# Patient Record
(demographics unavailable — no encounter records)

---

## 2024-11-27 NOTE — DEVELOPMENTAL MILESTONES
[Normal Development] : Normal Development [None] : none [Laughs aloud] : laughs aloud [Turns to voice] : turns to voice [Vocalizes with extending cooing] : vocalizes with extending cooing [Rolls over prone to supine] : rolls over prone to supine [Supports on elbows & wrists in prone] : supports on elbows and wrists in prone [Keeps hands unfisted] : keeps hands unfisted [Plays with fingers in midline] : plays with fingers in midline [Grasps objects] : grasps objects [Not Passed] : not passed [No] : Not Completed. [FreeTextEntry2] : 16

## 2024-11-27 NOTE — HISTORY OF PRESENT ILLNESS
[Mother] : mother [Normal] : Normal [In Bassinet/Crib] : sleeps in bassinet/crib [On back] : sleeps on back [Sleeps 12-16 hours per 24 hours (including naps)] : sleeps 12-16 hours per 24 hours (including naps) [Pacifier use] : Pacifier use [Tummy time] : tummy time [No] : No cigarette smoke exposure [Rear facing car seat in back seat] : Rear facing car seat in back seat [NO] : No [FreeTextEntry7] : LAST WELL AT 2 MONTHS OF AGE DID NOT SEEK COUNSELING [de-identified] : SLEEPING BETTER [de-identified] : ENFAMIL 6 OZ BOTTLES ( USUALLY TAKES ABOUT 4 ) [FreeTextEntry3] : THROUGH THE NIGHT 11PM-8 AM

## 2024-11-27 NOTE — DISCUSSION/SUMMARY
[Normal Growth] : growth [Normal Development] : development  [No Elimination Concerns] : elimination [Continue Regimen] : feeding [No Skin Concerns] : skin [Normal Sleep Pattern] : sleep [Term Infant] : term infant [Family Functioning] : family functioning [Nutritional Adequacy and Growth] : nutritional adequacy and growth [Infant Development] : infant development [Oral Health] : oral health [Safety] : safety [No Medications] : ~He/She~ is not on any medications [Mother] : mother [] : The components of the vaccine(s) to be administered today are listed in the plan of care. The disease(s) for which the vaccine(s) are intended to prevent and the risks have been discussed with the caretaker.  The risks are also included in the appropriate vaccination information statements which have been provided to the patient's caregiver.  The caregiver has given consent to vaccinate. [de-identified] : PENTACEL PREVNAR ROTA TODAY  DECLINES BEYFORTUS [de-identified] : DISCUSSED MAURICE, STRESSED IMPORTANCE OF SELF CARE [de-identified] : COUNSELING [de-identified] : WELL CARE IN 2 MONTHS

## 2024-11-27 NOTE — PHYSICAL EXAM
[Alert] : alert [Normocephalic] : normocephalic [Flat Open Anterior [place]] : flat open anterior fontanelle [Red Reflex] : red reflex bilateral [Normally Placed Ears] : normally placed ears [Light reflex present] : light reflex present [Nares Patent] : nares patent [Palate Intact] : palate intact [Clear to Auscultation Bilaterally] : clear to auscultation bilaterally [Regular Rate and Rhythm] : regular rate and rhythm [S1, S2 present] : S1, S2 present [Soft] : soft [Bowel Sounds] : bowel sounds present [External Genitalia] : normal external genitalia [Patent] : patent [No Abnormal Lymph Nodes Palpated] : no abnormal lymph nodes palpated [Cranial Nerves Grossly Intact] : cranial nerves grossly intact [Murmurs] : no murmurs [Lynch-Ortolani] : negative Lynch-Ortolani [Spinal Dimple] : no spinal dimple [Rash or Lesions] : no rash/lesions [de-identified] : NO THRUSH

## 2024-12-17 NOTE — HISTORY OF PRESENT ILLNESS
[de-identified] : congested [FreeTextEntry6] : The whole house has a cold, first her big sister and then mom and now baby. No fever. Still eating and drinking well. Sleep is disturbed by her symptoms. She remains active.

## 2024-12-17 NOTE — HISTORY OF PRESENT ILLNESS
[de-identified] : congested [FreeTextEntry6] : The whole house has a cold, first her big sister and then mom and now baby. No fever. Still eating and drinking well. Sleep is disturbed by her symptoms. She remains active.

## 2024-12-17 NOTE — REVIEW OF SYSTEMS
[Nasal Discharge] : nasal discharge [Nasal Congestion] : nasal congestion [Negative] : Gastrointestinal [Cough] : no cough [FreeTextEntry1] : sneezing

## 2024-12-17 NOTE — PHYSICAL EXAM
[Alert] : alert [Normocephalic] : normocephalic [EOMI] : grossly EOMI [Clear] : right tympanic membrane clear [Pink Nasal Mucosa] : pink nasal mucosa [Clear Rhinorrhea] : clear rhinorrhea [Supple] : supple [Clear to Auscultation Bilaterally] : clear to auscultation bilaterally [Soft] : soft [Normal Bowel Sounds] : normal bowel sounds [NL] : no abnormal lymph nodes palpated [No Acute Distress] : no acute distress [Playful] : playful [Erythematous Oropharynx] : nonerythematous oropharynx [Exudate] : no exudate [Tender] : nontender [Distended] : nondistended [Hepatosplenomegaly] : no hepatosplenomegaly [FreeTextEntry1] : Afebrile alert active

## 2025-01-26 NOTE — DISCUSSION/SUMMARY
[Normal Growth] : growth [Normal Development] : development [None] : No medical problems [No Elimination Concerns] : elimination [No Feeding Concerns] : feeding [No Skin Concerns] : skin [Normal Sleep Pattern] : sleep [Term Infant] : Term infant [Family Functioning] : family functioning [Nutrition and Feeding] : nutrition and feeding [Infant Development] : infant development [Oral Health] : oral health [Safety] : safety [No Medications] : ~He/She~ is not on any medications [Mother] : mother [de-identified] : STRESSED COUNSELING, INFORMATION PROVIDED AGAIN [de-identified] : WELL CARE IN 3 MONTHS [] : The components of the vaccine(s) to be administered today are listed in the plan of care. The disease(s) for which the vaccine(s) are intended to prevent and the risks have been discussed with the caretaker.  The risks are also included in the appropriate vaccination information statements which have been provided to the patient's caregiver.  The caregiver has given consent to vaccinate. [FreeTextEntry1] : Lucie is a almost 6 month old FT F here for her C. She has had slow weight gain but overall is tracking nicely along her growth curve. She is meeting her developmental milestones nicely.   Given [facility]'s failed PPD screen, we had a discussion about her seeking help. She states that she has been doing better, and will seek help if she begins feeling too overwhelmed. She also talked about going back to school and work soon for which she is looking forward to.  Plan:  Vaxelis, Prevnar and Rota #3 given today Deferred flu at this time.  Continue-12 feedings per day.  Discussed introducing single-ingredient foods rich in iron, one at a time.  Incorporate up to 4 oz of fluorinated water daily in a sippy cup.  When teeth erupt wipe daily with washcloth.  When in car, patient should be in rear-facing car seat in back seat.  Continue to put baby to sleep on back, in own crib with no loose or soft bedding.  Lower crib mattress. Help baby to maintain sleep and feeding routines.  May offer pacifier if needed. Continue tummy time when awake.  Ensure home is safe since baby is now more mobile.  Do not use infant walker.  Read aloud to baby. RTC for 9 month [facility] or sooner if any issues come up

## 2025-01-26 NOTE — PHYSICAL EXAM
[Alert] : alert [Playful] : playful [Normocephalic] : normocephalic [Flat Open Anterior [place]] : flat open anterior fontanelle [Red Reflex] : red reflex bilateral [PERRL] : PERRL [EOMI Bilateral] : EOMI bilateral [Normally Placed Ears] : normally placed ears [Auricles Well Formed] : auricles well formed [Clear Tympanic membranes] : clear tympanic membranes [Nares Patent] : nares patent [Palate Intact] : palate intact [Supple, full passive range of motion] : supple, full passive range of motion [Symmetric Chest Rise] : symmetric chest rise [Clear to Auscultation Bilaterally] : clear to auscultation bilaterally [Regular Rate and Rhythm] : regular rate and rhythm [S1, S2 present] : S1, S2 present [+2 Femoral Pulses] : (+) 2 femoral pulses [Soft] : soft [Bowel Sounds] : bowel sounds present [Normal External Genitalia] : normal external genitalia [Normally Placed] : normally placed [No Abnormal Lymph Nodes Palpated] : no abnormal lymph nodes palpated [Lynch-Ortolani] : postive Lynch-Ortolani [Symmetric Buttocks Creases] : symmetric buttocks creases [Plantar Grasp] : plantar grasp reflex present [Acute Distress] : no acute distress [Crying] : not crying [Conjunctivae with no discharge] : conjunctivae with discharge [Discharge] : no discharge [Palpable Masses] : no palpable masses [Murmurs] : no murmurs [Tender] : nontender [Distended] : nondistended [Hepatomegaly] : no hepatomegaly [Splenomegaly] : no splenomegaly [Spinal Dimple] : no spinal dimple [Tuft of Hair] : no tuft of hair [Rash or Lesions] : no rash/lesions [de-identified] : SMALL REDUCIBLE HERNIA

## 2025-01-26 NOTE — PHYSICAL EXAM
[Alert] : alert [Playful] : playful [Normocephalic] : normocephalic [Flat Open Anterior [place]] : flat open anterior fontanelle [Red Reflex] : red reflex bilateral [PERRL] : PERRL [EOMI Bilateral] : EOMI bilateral [Normally Placed Ears] : normally placed ears [Auricles Well Formed] : auricles well formed [Clear Tympanic membranes] : clear tympanic membranes [Nares Patent] : nares patent [Palate Intact] : palate intact [Supple, full passive range of motion] : supple, full passive range of motion [Symmetric Chest Rise] : symmetric chest rise [Clear to Auscultation Bilaterally] : clear to auscultation bilaterally [Regular Rate and Rhythm] : regular rate and rhythm [S1, S2 present] : S1, S2 present [+2 Femoral Pulses] : (+) 2 femoral pulses [Soft] : soft [Bowel Sounds] : bowel sounds present [Normal External Genitalia] : normal external genitalia [Normally Placed] : normally placed [No Abnormal Lymph Nodes Palpated] : no abnormal lymph nodes palpated [Lynch-Ortolani] : postive Lynch-Ortolani [Symmetric Buttocks Creases] : symmetric buttocks creases [Plantar Grasp] : plantar grasp reflex present [Acute Distress] : no acute distress [Crying] : not crying [Conjunctivae with no discharge] : conjunctivae with discharge [Discharge] : no discharge [Palpable Masses] : no palpable masses [Murmurs] : no murmurs [Tender] : nontender [Distended] : nondistended [Hepatomegaly] : no hepatomegaly [Splenomegaly] : no splenomegaly [Spinal Dimple] : no spinal dimple [Tuft of Hair] : no tuft of hair [Rash or Lesions] : no rash/lesions [de-identified] : SMALL REDUCIBLE HERNIA

## 2025-01-26 NOTE — COUNSELING
[Use of Plain Language] : use of plain language [Needs Reinforcement, Referred] : needs reinforcement, referred [Health Literacy] : health literacy [Behavioral] : behavioral [] : I have reviewed management goals with caretaker and provided a copy of care plan

## 2025-01-26 NOTE — DEVELOPMENTAL MILESTONES
[Pats or smiles at reflection] : pats or smiles at reflection [Begins to turn when name called] : begins to turn when name called [Babbles] : babbles [Rolls over prone to supine] : rolls over prone to supine [Sits briefly without support] : sits briefly without support [Reaches for object and transfers] : reaches for object and transfers [Rakes small object with 4 fingers] : rakes small object with 4 fingers [[place] small object on surface] : bangs small object on surface [Not Passed] : not passed [None] : none [FreeTextEntry1] : DENIES SUICIDAL IDEATION Discussed w/ MOC to seek help and therapy counseling.  [FreeTextEntry2] : 26 [No] : Not Completed.

## 2025-01-26 NOTE — DISCUSSION/SUMMARY
[Normal Growth] : growth [Normal Development] : development [None] : No medical problems [No Elimination Concerns] : elimination [No Feeding Concerns] : feeding [No Skin Concerns] : skin [Normal Sleep Pattern] : sleep [Term Infant] : Term infant [Family Functioning] : family functioning [Nutrition and Feeding] : nutrition and feeding [Infant Development] : infant development [Oral Health] : oral health [Safety] : safety [No Medications] : ~He/She~ is not on any medications [Mother] : mother [de-identified] : STRESSED COUNSELING, INFORMATION PROVIDED AGAIN [de-identified] : WELL CARE IN 3 MONTHS [] : The components of the vaccine(s) to be administered today are listed in the plan of care. The disease(s) for which the vaccine(s) are intended to prevent and the risks have been discussed with the caretaker.  The risks are also included in the appropriate vaccination information statements which have been provided to the patient's caregiver.  The caregiver has given consent to vaccinate. [FreeTextEntry1] : Lucie is a almost 6 month old FT F here for her C. She has had slow weight gain but overall is tracking nicely along her growth curve. She is meeting her developmental milestones nicely.   Given [facility]'s failed PPD screen, we had a discussion about her seeking help. She states that she has been doing better, and will seek help if she begins feeling too overwhelmed. She also talked about going back to school and work soon for which she is looking forward to.  Plan:  Vaxelis, Prevnar and Rota #3 given today Deferred flu at this time.  Continue-12 feedings per day.  Discussed introducing single-ingredient foods rich in iron, one at a time.  Incorporate up to 4 oz of fluorinated water daily in a sippy cup.  When teeth erupt wipe daily with washcloth.  When in car, patient should be in rear-facing car seat in back seat.  Continue to put baby to sleep on back, in own crib with no loose or soft bedding.  Lower crib mattress. Help baby to maintain sleep and feeding routines.  May offer pacifier if needed. Continue tummy time when awake.  Ensure home is safe since baby is now more mobile.  Do not use infant walker.  Read aloud to baby. RTC for 9 month [facility] or sooner if any issues come up

## 2025-01-26 NOTE — DEVELOPMENTAL MILESTONES
[Pats or smiles at reflection] : pats or smiles at reflection [Begins to turn when name called] : begins to turn when name called [Babbles] : babbles [Rolls over prone to supine] : rolls over prone to supine [Sits briefly without support] : sits briefly without support [Reaches for object and transfers] : reaches for object and transfers [Rakes small object with 4 fingers] : rakes small object with 4 fingers [[place] small object on surface] : bangs small object on surface [Not Passed] : not passed [None] : none [FreeTextEntry1] : DENIES SUICIDAL IDEATION Discussed w/ MOC to seek help and therapy counseling.  [FreeTextEntry2] : 26 [No] : Not Completed.

## 2025-01-26 NOTE — HISTORY OF PRESENT ILLNESS
[Mother] : mother [Breast milk] : breast milk [Formula ___ oz/feed] : [unfilled] oz of formula per feed [Formula ___ oz in 24hrs] : [unfilled] oz of formula in 24 hours [Normal] : Normal [___ voids per day] : [unfilled] voids per day [Frequency of stools: ___] : Frequency of stools: [unfilled]  stools [per day] : per day. [In Bassinet/Crib] : sleeps in bassinet/crib [On back] : sleeps on back [Pacifier use] : Pacifier use [Tummy time] : tummy time [Screen time only for video chatting] : screen time only for video chatting [Exposure to electronic nicotine delivery system] : Exposure to electronic nicotine delivery system [No] : No cigarette smoke exposure [Water heater temperature set at <120 degrees F] : Water heater temperature set at <120 degrees F [Rear facing car seat in back seat] : Rear facing car seat in back seat [Carbon Monoxide Detectors] : Carbon monoxide detectors at home [Smoke Detectors] : Smoke detectors at home. [Co-sleeping] : no co-sleeping [Sleeps 12-16 hours per 24 hours (including naps)] : Does not sleep 12-16 hours per 24 hours (including naps) [Loose bedding, pillow, toys, and/or bumpers in crib] : no loose bedding, pillow, toys, and/or bumpers in crib [de-identified] : LAST WELL AT 4 MONTHS  MOM DID NOT PERSUE COUNSELING AS RECOMMENDED  [de-identified] : None.  [de-identified] : Has formula about every 3/4 hours. Wakes up only once during the night to feed. [de-identified] : Due for 6 month vaccines today. Defers flu.  [NO] : No

## 2025-01-26 NOTE — HISTORY OF PRESENT ILLNESS
[Mother] : mother [Breast milk] : breast milk [Formula ___ oz/feed] : [unfilled] oz of formula per feed [Formula ___ oz in 24hrs] : [unfilled] oz of formula in 24 hours [Normal] : Normal [___ voids per day] : [unfilled] voids per day [Frequency of stools: ___] : Frequency of stools: [unfilled]  stools [per day] : per day. [In Bassinet/Crib] : sleeps in bassinet/crib [On back] : sleeps on back [Pacifier use] : Pacifier use [Tummy time] : tummy time [Screen time only for video chatting] : screen time only for video chatting [No] : No cigarette smoke exposure [Exposure to electronic nicotine delivery system] : Exposure to electronic nicotine delivery system [Water heater temperature set at <120 degrees F] : Water heater temperature set at <120 degrees F [Rear facing car seat in back seat] : Rear facing car seat in back seat [Carbon Monoxide Detectors] : Carbon monoxide detectors at home [Smoke Detectors] : Smoke detectors at home. [Co-sleeping] : no co-sleeping [Sleeps 12-16 hours per 24 hours (including naps)] : Does not sleep 12-16 hours per 24 hours (including naps) [Loose bedding, pillow, toys, and/or bumpers in crib] : no loose bedding, pillow, toys, and/or bumpers in crib [de-identified] : LAST WELL AT 4 MONTHS  MOM DID NOT PERSUE COUNSELING AS RECOMMENDED  [de-identified] : None.  [de-identified] : Has formula about every 3/4 hours. Wakes up only once during the night to feed. [NO] : No [de-identified] : Due for 6 month vaccines today. Defers flu.

## 2025-05-02 NOTE — DISCUSSION/SUMMARY
[Normal Growth] : growth [No Elimination Concerns] : elimination [No Feeding Concerns] : feeding [No Skin Concerns] : skin [Normal Sleep Pattern] : sleep [Term Infant] : Term infant [Delayed-Normal For Gest Age] : Developmental delayed but normal for patient's gestational age [Family Adaptation] : family adaptation [Infant [place]] : infant independence [Feeding Routine] : feeding routine [Safety] : safety [No Medications] : ~He/She~ is not on any medications [Mother] : mother [de-identified] : REFERRED [FreeTextEntry1] : CBC AND LEAD SENT [de-identified] : EI REFERRAL - REVIEWED [facility]AD[facility] [FreeTextEntry3] : WELL CARE IN 3 MONTHS

## 2025-05-02 NOTE — DEVELOPMENTAL MILESTONES
[Yes: _______] : yes, [unfilled] [Uses basic gestures] : does not use basic gestures [Says "Alb[place]" or "Mama"] : does not say "Albino" or "Mama" nonspecifically [Sits well without support] : does not sit well without support [Transitions between sitting and lying] : does not transition between sitting and lying [Balances on hands and knees] : balances on hands and knees [Crawls] : does not crawl [Picks up small objects with 3 fingers] : picks up small objects with 3 fingers and thumb [Releases objects intentionally] : releases objects intentionally [[place] objects together] : bangs objects together [Yes] : Completed. [FreeTextEntry1] : FAILED SCORE 3  REFERRED

## 2025-05-02 NOTE — HISTORY OF PRESENT ILLNESS
[Normal] : Normal [In Crib] : sleeps in crib [Sleeps 12-16 hours per 24 hours (including naps)] : sleeps 12-16 hours per 24 hours (including naps) [Mother] : mother [Pacifier use] : Pacifier use [Sippy Cup use] : sippy cup use [None] : Primary Fluoride Source: None [No] : No exposure to electronic nicotine device [Yes] : At  exposure [Rear facing car seat in  back seat] : Rear facing car seat in  back seat [Up to date] : Up to date [FreeTextEntry7] : LAST WELL AT 6 MONTHS [de-identified] : DEVELOPMENT, NOT CRAWLING [de-identified] : FORMULA ALL PUREES  [de-identified] : 9PM -6AM NAPS 2X [de-identified] : 2 LOWER TEETH [de-identified] : CBC AND LEAD SENT TODAY [NO] : No

## 2025-05-02 NOTE — PHYSICAL EXAM
[Alert] : alert [Normocephalic] : normocephalic [Flat Open Anterior [place]] : flat open anterior fontanelle [Red Reflex] : red reflex bilateral [Normally Placed Ears] : normally placed ears [Light reflex present] : light reflex present [Nares Patent] : nares patent [Palate Intact] : palate intact [Tooth Eruption] : tooth eruption [Clear to Auscultation Bilaterally] : clear to auscultation bilaterally [Regular Rate and Rhythm] : regular rate and rhythm [S1, S2 present] : S1, S2 present [Murmurs] : no murmurs [Soft] : soft [Bowel Sounds] : bowel sounds present [Normal External Genitalia] : normal external genitalia [No Abnormal Lymph Nodes Palpated] : no abnormal lymph nodes palpated [Symmetric abduction and rotation of hips] : symmetric abduction and rotation of hips [Cranial Nerves Grossly Intact] : cranial nerves grossly intact [Rash or Lesions] : no rash/lesions [de-identified] : BABBLING  [de-identified] : 2 LOWER TEETH [de-identified] : POOR TRUNK CONTROL